# Patient Record
Sex: FEMALE | Race: BLACK OR AFRICAN AMERICAN | Employment: STUDENT | URBAN - METROPOLITAN AREA
[De-identification: names, ages, dates, MRNs, and addresses within clinical notes are randomized per-mention and may not be internally consistent; named-entity substitution may affect disease eponyms.]

---

## 2017-02-22 ENCOUNTER — HOSPITAL ENCOUNTER (EMERGENCY)
Age: 22
Discharge: HOME | End: 2017-02-22
Attending: EMERGENCY MEDICINE
Payer: COMMERCIAL

## 2017-02-22 VITALS
WEIGHT: 151.46 LBS | TEMPERATURE: 99.2 F | RESPIRATION RATE: 18 BRPM | HEART RATE: 88 BPM | BODY MASS INDEX: 25.23 KG/M2 | HEIGHT: 65 IN | DIASTOLIC BLOOD PRESSURE: 79 MMHG | OXYGEN SATURATION: 99 % | SYSTOLIC BLOOD PRESSURE: 120 MMHG

## 2017-02-22 DIAGNOSIS — L05.01 PILONIDAL CYST WITH ABSCESS: Primary | ICD-10-CM

## 2017-02-22 PROCEDURE — 77030019895 HC PCKNG STRP IODO -A

## 2017-02-22 PROCEDURE — 75810000116 HC INC/DRN PILONIDAL CYST SIMPLE

## 2017-02-22 PROCEDURE — 99283 EMERGENCY DEPT VISIT LOW MDM: CPT

## 2017-02-22 PROCEDURE — 74011250637 HC RX REV CODE- 250/637: Performed by: PHYSICIAN ASSISTANT

## 2017-02-22 RX ORDER — OXYCODONE AND ACETAMINOPHEN 5; 325 MG/1; MG/1
1 TABLET ORAL
Qty: 10 TAB | Refills: 0 | Status: SHIPPED | OUTPATIENT
Start: 2017-02-22 | End: 2017-02-25

## 2017-02-22 RX ORDER — OXYCODONE AND ACETAMINOPHEN 5; 325 MG/1; MG/1
1 TABLET ORAL ONCE
Status: COMPLETED | OUTPATIENT
Start: 2017-02-22 | End: 2017-02-22

## 2017-02-22 RX ORDER — SULFAMETHOXAZOLE AND TRIMETHOPRIM 800; 160 MG/1; MG/1
2 TABLET ORAL 2 TIMES DAILY
Qty: 14 TAB | Refills: 0 | Status: SHIPPED | OUTPATIENT
Start: 2017-02-22 | End: 2017-03-01

## 2017-02-22 RX ORDER — LIDOCAINE HYDROCHLORIDE AND EPINEPHRINE 10; 10 MG/ML; UG/ML
2 INJECTION, SOLUTION INFILTRATION; PERINEURAL
Status: DISCONTINUED | OUTPATIENT
Start: 2017-02-22 | End: 2017-02-22 | Stop reason: HOSPADM

## 2017-02-22 RX ORDER — BUPIVACAINE HYDROCHLORIDE 5 MG/ML
2 INJECTION, SOLUTION EPIDURAL; INTRACAUDAL
Status: DISCONTINUED | OUTPATIENT
Start: 2017-02-22 | End: 2017-02-22 | Stop reason: HOSPADM

## 2017-02-22 RX ORDER — CEPHALEXIN 500 MG/1
500 CAPSULE ORAL 4 TIMES DAILY
Qty: 28 CAP | Refills: 0 | Status: SHIPPED | OUTPATIENT
Start: 2017-02-22 | End: 2017-03-01

## 2017-02-22 RX ADMIN — OXYCODONE HYDROCHLORIDE AND ACETAMINOPHEN 1 TABLET: 5; 325 TABLET ORAL at 19:46

## 2017-02-22 NOTE — LETTER
[address] 
[facility] EMERGENCY DEPT 
[address] [place] [address] [address] [id number] [phone] Work/School Note Date: 2/22/2017 To Whom It May concern: 
 
Gill Napoles was seen and treated today in the emergency room by the following provider(s): 
Attending Provider: Hawa Ken MD 
Physician Assistant: Tima Valdez PA-C. Gill Napoles may return to work on 25 February 2017. Sincerely, Tima Valdez PA-C

## 2017-02-23 NOTE — ED NOTES
Pt discharged by PA. Alert and oriented ambulatory with steady gait at VT. With friend at VT to act as her ride. No acute distress noted at VT.

## 2017-02-23 NOTE — DISCHARGE INSTRUCTIONS
Thank you for allowing us to provide you with excellent care today. We hope we addressed all of your concerns and needs. We strive to provide excellent quality care in the Emergency Department. Please rate us as excellent, as anything less than excellent does not meet our expectations. If you feel that you have not received excellent quality care or timely care, please ask to speak to the nurse manager. Please choose us in the future for your continued health care needs. The exam and treatment you received in the Emergency Department were for an urgent problem and are not intended as complete care. It is important that you follow-up with a doctor, nurse practitioner, or physician assistant to:  (1) confirm your diagnosis,  (2) re-evaluation of changes in your illness and treatment, and  (3) for ongoing care. If your symptoms become worse or you do not improve as expected and you are unable to reach your usual health care provider, you should return to the Emergency Department. We are available 24 hours a day. Take this sheet with you when you go to your follow-up visit. If you have any problem arranging the follow-up visit, contact [address] [place] [address] 21 [phone])    Make an appointment with your Primary Care doctor for follow up of this visit. Return to the ER if you are unable to be seen in the time recommended on your discharge instructions. Pilonidal Abscess: Care Instructions  Your Care Instructions    A pilonidal abscess is an infection caused by an ingrown hair. The abscess occurs in the area of the tailbone and the top of the buttocks. The infection causes a pocket of pus to form. It can be quite painful. Your doctor may have opened and drained the abscess. You can take care of yourself at home to help the area heal. In some cases, the abscess returns. Your doctor may suggest surgery to remove the site of the infection if it comes back. You may have had a sedative to help you relax.  You may be unsteady after having sedation. It can take a few hours for the medicine's effects to wear off. Common side effects of sedation include nausea, vomiting, and feeling sleepy or tired. The doctor has checked you carefully, but problems can develop later. If you notice any problems or new symptoms, get medical treatment right away. Follow-up care is a key part of your treatment and safety. Be sure to make and go to all appointments, and call your doctor if you are having problems. It's also a good idea to know your test results and keep a list of the medicines you take. How can you care for yourself at home? · If the doctor gave you a sedative:  ¨ For 24 hours, don't do anything that requires attention to detail. It takes time for the medicine's effects to completely wear off. ¨ For your safety, do not drive or operate any machinery that could be dangerous. Wait until the medicine wears off and you can think clearly and react easily. · If your doctor prescribed antibiotics, take them exactly as directed. Do not stop taking them just because you feel better. You need to take the full course of antibiotics. · Be safe with medicines. Take pain medicines exactly as directed. ¨ If the doctor gave you a prescription medicine for pain, take it as prescribed. ¨ If you are not taking a prescription pain medicine, ask your doctor if you can take an over-the-counter medicine. · If your doctor opened and drained your abscess, you may have gauze or other packing material inside your wound. Follow all instructions from your doctor on how to care for your wound. · Keep the area of your wound very clean. Use wet cotton balls, a warm washcloth, or baby wipes. Clean the area gently, especially after a bowel movement. When should you call for help? Call 911 anytime you think you may need emergency care. For example, call if:  · You have trouble breathing. · You passed out (lost consciousness).   Call your doctor now or seek immediate medical care if:  · You have new or worse nausea or vomiting. · Your pain increases. · You have pain with a fever. Watch closely for changes in your health, and be sure to contact your doctor if:  · Your pain does not get better in a day or two. Where can you learn more? Go to http://carolyn-carl.info/. Enter 22 490338 in the search box to learn more about \"Pilonidal Abscess: Care Instructions. \"  Current as of: February 5, 2016  Content Version: 11.1  © 9308-6749 [employer]. Care instructions adapted under license by [employer] (which disclaims liability or warranty for this information). If you have questions about a medical condition or this instruction, always ask your healthcare professional. [address] any warranty or liability for your use of this information.

## 2017-02-23 NOTE — ED PROVIDER NOTES
HPI Comments: Ramirez Juarez is a 25 y.o. female with a PMH of ADHD, asthma and bipolar I disorder presenting ambulatory to the ED from home C/O abscess on her buttocks that has been present for the past 2-3 days. Patient states that she has a history of a cyst in the same spot and had it drained a little over a year ago. She presents today because she has not been able to get any drainage from it and it is causing her significant pain. She has tried soaking the cyst and applying warm compresses with little relief. She states that she has not taking anything for the pain PTA. Currently she rates the pain as a 9/10 in intensity. She denies any recent fevers, chills, chest pain, SOB, abdominal pain, nausea, vomiting or diarrhea. Patient denies any other symptoms or complaints. PCP: None    There are no other complaints, changes or physical findings at this time. The history is provided by the patient. No  was used. Past Medical History:   Diagnosis Date    ADHD (attention deficit hyperactivity disorder)     Asthma     [facility]        Past Surgical History:   Procedure Laterality Date    HX TONSIL AND ADENOIDECTOMY           Family History:   Problem Relation Age of Onset    Diabetes Mother     Diabetes Father     Hypertension Father     Schizophrenia Father        Social History     Social History    Marital status: SINGLE     Spouse name: N/A    Number of children: N/A    Years of education: N/A     Occupational History    Not on file.      Social History Main Topics    Smoking status: Never Smoker    Smokeless tobacco: Not on file    Alcohol use 0.0 oz/week     0 Standard drinks or equivalent per week      Comment: socially    Drug use: Yes     Special: Marijuana      Comment: marijuania    Sexual activity: Yes     Birth control/ protection: Implant     Other Topics Concern    Not on file     Social History Narrative         ALLERGIES: Review of patient's allergies indicates no known allergies. Review of Systems   Constitutional: Negative for chills and fever. HENT: Negative for sore throat. Eyes: Negative for pain. Respiratory: Negative for cough and shortness of breath. Cardiovascular: Negative for chest pain. Gastrointestinal: Negative for abdominal pain, diarrhea, nausea and vomiting. Genitourinary: Negative for dysuria and hematuria. Musculoskeletal: Negative for arthralgias and myalgias. Skin: Positive for rash. Neurological: Negative for dizziness, weakness, light-headedness, numbness and headaches. Psychiatric/Behavioral: Negative for confusion. Vitals:    02/22/17 1912   BP: 120/79   Pulse: 88   Resp: 18   Temp: 99.2 °F (37.3 °C)   SpO2: 99%   Weight: 68.7 kg (151 lb 7.3 oz)   Height: 5' 5\" (1.651 m)            Physical Exam   Constitutional: She is oriented to person, place, and time. She appears well-developed and well-nourished. No distress. 24 y/o thin well appearing AAF in no acute distress   HENT:   Head: Normocephalic and atraumatic. Right Ear: External ear normal.   Left Ear: External ear normal.   Nose: Nose normal.   Eyes: Conjunctivae and EOM are normal.   Neck: Normal range of motion. Neck supple. Cardiovascular: Normal rate and regular rhythm. No murmur heard. Pulmonary/Chest: Effort normal and breath sounds normal. She has no wheezes. Abdominal: Soft. Bowel sounds are normal. She exhibits no distension. There is no tenderness. There is no guarding. Musculoskeletal: Normal range of motion. She exhibits no edema or tenderness. Neurological: She is alert and oriented to person, place, and time. Skin: Skin is warm and dry. She is not diaphoretic. Abscess formation at the top of the gluteal crease with surrounding induration. Abscess is fluctuant but without erythema. No drainage able to be expressed. Tenderness to palpation to area. Psychiatric: She has a normal mood and affect. Her behavior is normal. Judgment normal.   Nursing note and vitals reviewed. MDM  Number of Diagnoses or Management Options  Pilonidal cyst with abscess:   Diagnosis management comments: DDx: abscess, folliculitis, cellulitis, pilonidal cyst, poor hygiene. Patient presents with abscess formation to top of gluteal crease consistent with pilonidal cyst. Will perform I&D and d/c with antibiotics. ED Course       Procedures    Chief Complaint   Patient presents with    Abscess     on buttocks for 3 days painful and red no drainage        7:24 PM  The patients presenting problems have been discussed, and they are in agreement with the care plan formulated and outlined with them. I have encouraged them to ask questions as they arise throughout their visit. MEDICATIONS GIVEN:  Medications   bupivacaine (PF) (MARCAINE) 0.5 % (5 mg/mL) injection 10 mg (not administered)   lidocaine-EPINEPHrine (XYLOCAINE) 1 %-1:100,000 injection 20 mg (not administered)   oxyCODONE-acetaminophen (PERCOCET) 5-325 mg per tablet 1 Tab (1 Tab Oral Given 2/22/17 1946)       LABS REVIEWED:  No results found for this or any previous visit (from the past 24 hour(s)). VITAL SIGNS:  Patient Vitals for the past 12 hrs:   Temp Pulse Resp BP SpO2   02/22/17 1912 99.2 °F (37.3 °C) 88 18 120/79 99 %       PROCEDURES:  Procedure Note - Incision and Drainage:   8:00 PM  Performed by: Lakesha Garcia PA-C  Complexity: simple  Skin prepped with Betadine. Sterile field established. Anesthesia achieved with 5 mLs of 50/50 mixture of Lidocaine 1% with epinephrine and Bupivacaine 0.25% without epinephrine using a local infiltration. Abscess to superior gluteal crease was incised with # 11 blade, and 10mLs of foul smelling purulent drainage was expressed. Wound probed and irrigated. Area was packed using 1/4 inch iodoform gauze. Sterile dressing applied.     Estimated blood loss: < 5 mL  The procedure took 10 minutes, and pt tolerated well.      PROGRESS NOTES:  8:13 PM  Discussed with patient that if this is a recurrent problem, may need to f/u with surgery to have tract removed. Patient states that her mother needed to have the surgery done. Discussed that will discharge with antibiotics and patient will need to have wound rechecked and packing removed in 2 days. Patient agrees with plan. DIAGNOSIS:    1. Pilonidal cyst with abscess        PLAN:  Follow-up Information     Follow up With Details Comments Contact Info    [facility] EMERGENCY DEPT In 2 days For wound re-check [address] [facility]  [address]   [address]        Current Discharge Medication List      START taking these medications    Details   cephALEXin (KEFLEX) 500 mg capsule Take 1 Cap by mouth four (4) times daily for 7 days. Qty: 28 Cap, Refills: 0      trimethoprim-sulfamethoxazole (BACTRIM DS) 160-800 mg per tablet Take 2 Tabs by mouth two (2) times a day for 7 days. Qty: 14 Tab, Refills: 0      oxyCODONE-acetaminophen (PERCOCET) 5-325 mg per tablet Take 1 Tab by mouth every six (6) hours as needed for Pain for up to 3 days. Max Daily Amount: 4 Tabs. Qty: 10 Tab, Refills: 0         CONTINUE these medications which have NOT CHANGED    Details   etonogestrel 68 mg impl by SubDERmal route. ED COURSE: The patients hospital course has been uncomplicated. DISCHARGE NOTE:  8:23 PM  The care plan has been outline with the patient and/or family, who verbally conveyed understanding and agreement. Available results have been reviewed. Patient and/or family understand the follow up plan as outlined and discharge instructions. Should their condition deterioration at any time after discharge the patient agrees to return, follow up sooner than outlined or seek medical assistance at the closest Emergency Room as soon as possible. Questions have been answered.  Discharge instructions and educational information regarding the patient's diagnosis as well a list of reasons why the patient would want to seek immediate medical attention, should their condition change, were reviewed directly with the patient/family

## 2017-06-18 ENCOUNTER — HOSPITAL ENCOUNTER (EMERGENCY)
Age: 22
Discharge: HOME | End: 2017-06-19
Attending: EMERGENCY MEDICINE
Payer: COMMERCIAL

## 2017-06-18 VITALS
SYSTOLIC BLOOD PRESSURE: 126 MMHG | RESPIRATION RATE: 16 BRPM | WEIGHT: 144.62 LBS | HEIGHT: 66 IN | OXYGEN SATURATION: 100 % | HEART RATE: 103 BPM | DIASTOLIC BLOOD PRESSURE: 81 MMHG | TEMPERATURE: 98.9 F | BODY MASS INDEX: 23.24 KG/M2

## 2017-06-18 DIAGNOSIS — L05.01 PILONIDAL ABSCESS: Primary | ICD-10-CM

## 2017-06-18 PROCEDURE — 99284 EMERGENCY DEPT VISIT MOD MDM: CPT

## 2017-06-18 PROCEDURE — 74011250637 HC RX REV CODE- 250/637: Performed by: PHYSICIAN ASSISTANT

## 2017-06-18 PROCEDURE — 75810000289 HC I&D ABSCESS SIMP/COMP/MULT

## 2017-06-18 PROCEDURE — 74011000250 HC RX REV CODE- 250: Performed by: PHYSICIAN ASSISTANT

## 2017-06-18 RX ORDER — LIDOCAINE HYDROCHLORIDE AND EPINEPHRINE 20; 5 MG/ML; UG/ML
1.5 INJECTION, SOLUTION EPIDURAL; INFILTRATION; INTRACAUDAL; PERINEURAL
Status: COMPLETED | OUTPATIENT
Start: 2017-06-18 | End: 2017-06-18

## 2017-06-18 RX ORDER — NAPROXEN 250 MG/1
500 TABLET ORAL
Status: COMPLETED | OUTPATIENT
Start: 2017-06-18 | End: 2017-06-18

## 2017-06-18 RX ORDER — SULFAMETHOXAZOLE AND TRIMETHOPRIM 800; 160 MG/1; MG/1
2 TABLET ORAL
Status: COMPLETED | OUTPATIENT
Start: 2017-06-18 | End: 2017-06-18

## 2017-06-18 RX ADMIN — NAPROXEN 500 MG: 250 TABLET ORAL at 23:39

## 2017-06-18 RX ADMIN — SULFAMETHOXAZOLE AND TRIMETHOPRIM 2 TABLET: 800; 160 TABLET ORAL at 23:39

## 2017-06-18 RX ADMIN — LIDOCAINE HYDROCHLORIDE,EPINEPHRINE BITARTRATE 30 MG: 20; .005 INJECTION, SOLUTION EPIDURAL; INFILTRATION; INTRACAUDAL; PERINEURAL at 23:39

## 2017-06-19 PROCEDURE — 75810000289 HC I&D ABSCESS SIMP/COMP/MULT

## 2017-06-19 RX ORDER — NAPROXEN 500 MG/1
500 TABLET ORAL
Qty: 20 TAB | Refills: 0 | Status: SHIPPED | OUTPATIENT
Start: 2017-06-19

## 2017-06-19 RX ORDER — OXYCODONE AND ACETAMINOPHEN 5; 325 MG/1; MG/1
1 TABLET ORAL
Qty: 8 TAB | Refills: 0 | Status: SHIPPED | OUTPATIENT
Start: 2017-06-19

## 2017-06-19 RX ORDER — SULFAMETHOXAZOLE AND TRIMETHOPRIM 800; 160 MG/1; MG/1
2 TABLET ORAL 2 TIMES DAILY
Qty: 40 TAB | Refills: 0 | Status: SHIPPED | OUTPATIENT
Start: 2017-06-19

## 2017-06-19 NOTE — DISCHARGE INSTRUCTIONS
Pilonidal Abscess: Care Instructions  Your Care Instructions    A pilonidal abscess is an infection caused by an ingrown hair. The abscess occurs in the area of the tailbone and the top of the buttocks. The infection causes a pocket of pus to form. It can be quite painful. Your doctor may have opened and drained the abscess. You can take care of yourself at home to help the area heal. In some cases, the abscess returns. Your doctor may suggest surgery to remove the site of the infection if it comes back. You may have had a sedative to help you relax. You may be unsteady after having sedation. It can take a few hours for the medicine's effects to wear off. Common side effects of sedation include nausea, vomiting, and feeling sleepy or tired. The doctor has checked you carefully, but problems can develop later. If you notice any problems or new symptoms, get medical treatment right away. Follow-up care is a key part of your treatment and safety. Be sure to make and go to all appointments, and call your doctor if you are having problems. It's also a good idea to know your test results and keep a list of the medicines you take. How can you care for yourself at home? · If the doctor gave you a sedative:  ¨ For 24 hours, don't do anything that requires attention to detail. It takes time for the medicine's effects to completely wear off. ¨ For your safety, do not drive or operate any machinery that could be dangerous. Wait until the medicine wears off and you can think clearly and react easily. · If your doctor prescribed antibiotics, take them exactly as directed. Do not stop taking them just because you feel better. You need to take the full course of antibiotics. · Be safe with medicines. Take pain medicines exactly as directed. ¨ If the doctor gave you a prescription medicine for pain, take it as prescribed.   ¨ If you are not taking a prescription pain medicine, ask your doctor if you can take an over-the-counter medicine. · If your doctor opened and drained your abscess, you may have gauze or other packing material inside your wound. Follow all instructions from your doctor on how to care for your wound. · Keep the area of your wound very clean. Use wet cotton balls, a warm washcloth, or baby wipes. Clean the area gently, especially after a bowel movement. When should you call for help? Call 911 anytime you think you may need emergency care. For example, call if:  · You have trouble breathing. · You passed out (lost consciousness). Call your doctor now or seek immediate medical care if:  · You have new or worse nausea or vomiting. · Your pain increases. · You have pain with a fever. Watch closely for changes in your health, and be sure to contact your doctor if:  · Your pain does not get better in a day or two. Where can you learn more? Go to http://carolyn-carl.info/. Enter 22 700474 in the search box to learn more about \"Pilonidal Abscess: Care Instructions. \"  Current as of: October 13, 2016  Content Version: 11.2  © 7543-8361 [employer]. Care instructions adapted under license by [employer] (which disclaims liability or warranty for this information). If you have questions about a medical condition or this instruction, always ask your healthcare professional. [place] [address] any warranty or liability for your use of this information. [facility] Departments     For adult and child immunizations, family planning, TB screening, STD testing and women's health services.    Serena: Samuel [phone]      PACCAR [facility] [address]   7 [facility]   [address]   170 [facility]: [address] [phone]      [facility]          [address] [address]     For primary care services, woman and child wellness, and some clinics providing specialty care. [facility] -- [address] 2525 [facility] [phone]   Manny [facility] 200 [facility] Tablo [facility] [phone]   [address] [facility] [address] [phone]   [address]4 [facility] [address] [facility] [phone]   7706 [facility] [phone]   [facility] 81 [facility] [phone]   Delorise [facility] [place] [address] [place], [place] [phone]   [facility] 700 [address] [facility] [address] [phone]     [address] [phone]   [facility] [address] [facility] [phone]   [employer]  [address], [address] ([email or web address]) [phone]         Sexual Health/Woman Wellness Clinics    For STD/HIV testing and treatment, pregnancy testing and services, men's health, birth control services, LGBT services, and hepatitis/HPV vaccine services. [facility] [place] [employer] [address] [facility] [address] [facility] [address] [facility] 1579 [address] [phone]   [facility] [address] , 5th floor [phone]   Pregnancy [address] [address] [facility] [address] [phone]         [address]67 [facility] [address] [place] [address]   [phone]   [place]   [phone]   Women, Infant and Children's Services: [address] [phone]       [address] [phone]   [facility]   [phone]   [address]   4761 [facility]     [phone]   [address] Crisis   1212 [facility] [phone]     Local Primary Care Physicians  [facility] [phone]  Brigette Moya, MD Michael Gr, MD Khushboo Mccord MD [facility] [phone]  Enriqueta Moses, [facility]  Karen Fair, MD Elida Murillo, MD Grady Singletary Huey Brays  [phone]  Yumi Villagran, MD Kaden Mauro MD [address] [facility] [phone]  Anderson Gosselin, MD Jam Siu, MD Ander Stewart, MD Dylon Pulido MD   [facility] [phone]  HXJI WMNJNV , MD Donita Candelario, MD Batsheva Villegas, NP [address] [facility] [address] [phone]  Leigh Rosenbaum, MD Yisel Vizcarra, MD Poly Blanton, MD Rosita Simmons, MD Lobito Zelaya, MD Chandu Gamboa, MD Aristeo Galloway MD   33 57 [facility]  Vania Ferrera, MD [facility] [phone]  Smiley Stuart, MD Brittany Collier, NP  Mary Kay Torres, MD Selena Trejo, MD Aldo Valentine, MD Rebecca Garg, MD Cy Mtz MD   3260 [facility] [phone]  Georgia Mas, MD Lucio Fernando, P  Ivy Height, NP  Joon Hanson, MD Jessica Perez, MD Maribel Canchola, MD Kallie Ny MD [facility] [phone]  MD Rylee Webb, MD Angeles Briseno, MD Tashi Lu, MD Michelle Calderon MD   [address] [phone]  MD Ronda Chavira, MD Sales [phone]  Susana Hope, MD Baltazar Pickens, MD   [facility] [phone]  Stiven Scales, MD April Montesinos, MD Danna Hdz, MD Jaye Moore, MD  [place] MD Kei Downs, NP  Ollie Knowles MD [address]   [phone]  MD Yun Andersen MD Chales Saltness, MD   0734 [facility] [phone]  MD Imani Joshua, DOUG Haskins, SHANIA Fong, PA-C Spence Sloan, MD Raiford Meigs, JOSE Campbell, DO Miscellaneous:  Rosario Allen [phone]

## 2017-06-19 NOTE — ED PROVIDER NOTES
HPI Comments: Leslee Sprague is a 25 y.o. female with PMhx significant for bipolar disorder who presents ambulatory to the ED for further evaluation of an abscess to the top of her gluteus noticed 3-4 days ago. She discloses a h/o a pilonidal abscess but denies following up with a surgeon after her previous I&D. She specifically denies any fevers. PCP: None      There are no other complaints, changes or physical findings at this time. Written by LUIS Barbosa, as dictated by DOUG Rankin     The history is provided by the patient. No  was used. Past Medical History:   Diagnosis Date    ADHD (attention deficit hyperactivity disorder)     Asthma     Bipolar 1 disorder ([address]        Past Surgical History:   Procedure Laterality Date    HX TONSIL AND ADENOIDECTOMY           Family History:   Problem Relation Age of Onset    Diabetes Mother     Diabetes Father     Hypertension Father     Schizophrenia Father        Social History     Social History    Marital status: SINGLE     Spouse name: N/A    Number of children: N/A    Years of education: N/A     Occupational History    Not on file. Social History Main Topics    Smoking status: Current Some Day Smoker    Smokeless tobacco: Not on file      Comment: 1 black and mild per day    Alcohol use 0.0 oz/week     0 Standard drinks or equivalent per week      Comment: socially    Drug use: Yes     Special: Marijuana      Comment: marijuania    Sexual activity: Yes     Birth control/ protection: Implant     Other Topics Concern    Not on file     Social History Narrative         ALLERGIES: Review of patient's allergies indicates no known allergies. Review of Systems   Constitutional: Negative. Negative for fever. HENT: Negative. Eyes: Negative. Respiratory: Negative. Cardiovascular: Negative. Gastrointestinal: Negative. Negative for constipation, diarrhea, nausea and vomiting. Denies liver disease   Endocrine:        Denies thyroid disease   Genitourinary: Negative. Negative for dysuria. Denies kidney disease   Musculoskeletal: Negative. Skin: Positive for wound (abscess gluteal cleft). Neurological: Negative. All other systems reviewed and are negative. Vitals:    06/18/17 2245   BP: 126/81   Pulse: (!) 103   Resp: 16   Temp: 98.9 °F (37.2 °C)   SpO2: 100%   Weight: 65.6 kg (144 lb 10 oz)   Height: 5' 5.5\" (1.664 m)            Physical Exam   Constitutional: She is oriented to person, place, and time. She appears well-developed and well-nourished. No distress. Ambulatory   HENT:   Head: Normocephalic and atraumatic. Right Ear: External ear normal.   Left Ear: External ear normal.   Nose: Nose normal.   Mouth/Throat: Oropharynx is clear and moist. No oropharyngeal exudate. Eyes: Conjunctivae and EOM are normal. Pupils are equal, round, and reactive to light. Right eye exhibits no discharge. Left eye exhibits no discharge. No scleral icterus. Neck: Normal range of motion. Neck supple. No tracheal deviation present. Cardiovascular: Normal rate, regular rhythm, normal heart sounds and intact distal pulses. Exam reveals no gallop and no friction rub. No murmur heard. Pulmonary/Chest: Effort normal and breath sounds normal. No respiratory distress. She has no wheezes. She has no rales. She exhibits no tenderness. Musculoskeletal: She exhibits no edema or tenderness. Lymphadenopathy:     She has no cervical adenopathy. Neurological: She is alert and oriented to person, place, and time. No cranial nerve deficit. Skin: Skin is warm and dry. No rash noted. Erythematous, indurated and tender to the superior aspect of the gluteal cleft consistent with pilonidal abscess   Psychiatric: She has a normal mood and affect. Her behavior is normal.   Nursing note and vitals reviewed.        MDM  Number of Diagnoses or Management Options  Diagnosis management comments: DDx: Abscess, Cellulitis, Folliculitis. Amount and/or Complexity of Data Reviewed  Review and summarize past medical records: yes    Patient Progress  Patient progress: stable    ED Course       Procedures    Procedure Note - Incision and Drainage:   12:15 AM  Performed by: Marshall Serna  Complexity: complex   Skin prepped with Hibiclens. Sterile field established. Anesthesia achieved with 1 mLs of Lidocaine 2% with epinephrine using a local infiltration. Abscess to buttocks was incised with # 11 blade, and 10mLs of purulent drainage was expressed. Wound probed and irrigated. Area was packed using 1 inch plain gauze. Sterile dressing applied. Estimated blood loss: less than 1 mL   The procedure took 16-30 minutes, and pt tolerated well. Written by Jania Ordaz ED Scribe, as dictated by DOUG Ibarra. MEDICATIONS GIVEN:  Medications   lidocaine-EPINEPHrine (PF) (XYLOCAINE) 2 %-1:200,000 injection 30 mg (30 mg SubCUTAneous Given by Provider 6/18/17 2339)   trimethoprim-sulfamethoxazole (BACTRIM DS, SEPTRA DS) 160-800 mg per tablet 2 Tab (2 Tabs Oral Given 6/18/17 2339)   naproxen (NAPROSYN) tablet 500 mg (500 mg Oral Given 6/18/17 2339)       IMPRESSION:  1. Pilonidal abscess        PLAN:  1. Current Discharge Medication List      START taking these medications    Details   oxyCODONE-acetaminophen (PERCOCET) 5-325 mg per tablet Take 1 Tab by mouth every six (6) hours as needed for Pain. Max Daily Amount: 4 Tabs. Qty: 8 Tab, Refills: 0      naproxen (NAPROSYN) 500 mg tablet Take 1 Tab by mouth every twelve (12) hours as needed for Pain. Qty: 20 Tab, Refills: 0      trimethoprim-sulfamethoxazole (BACTRIM DS) 160-800 mg per tablet Take 2 Tabs by mouth two (2) times a day. Qty: 40 Tab, Refills: 0           2.    Follow-up Information     Follow up With Details Comments Contact Info    Robert Saavedra MD  surgeon for more definitive treatment [address] [place] [address] 3 [address] [address] [address] [place] [address] [address]  [phone]      [facility] EMERGENCY DEPT In 2 days For wound re-check [address] [place] [address] Brian Markham        3. Return to ED if worse   Discharge Note:  12:35 AM  The patient is ready for discharge. The patient's signs, symptoms, diagnosis, and discharge instruction have been discussed and the patient has conveyed their understanding. The patient is to follow up as recommended or return to the ER should their symptoms worsen. Plan has been discussed and the patient is in agreement. Written by Antonio Baez ED Scribe, as dictated by PA-C Terrance Sandhoff    Attestation: This note is prepared by Elodia Baez, acting as Scribe for Jesse Talley. PA-C Terrance Sandhoff: The scribe's documentation has been prepared under my direction and personally reviewed by me in its entirety. I confirm that the note above accurately reflects all work, treatment, procedures, and medical decision making performed by me.

## 2017-06-19 NOTE — ED NOTES
Assumed care of patient from triage. Patient reports abscess to top of gluteal cleft \"about 3-4 days ago. \" Patient denies drainage or fevers.

## 2017-06-19 NOTE — ED NOTES
Discharge instructions reviewed with patient. Discharge instructions given to patient per Vera Milotn PA-C. Patient able to return/verbalize discharge instructions. Copy of discharge instructions provided. Patient condition stable, respiratory status within normal limits, neuro status intact. Ambulatory out of ER, accompanied by family.